# Patient Record
(demographics unavailable — no encounter records)

---

## 2024-10-15 NOTE — ASSESSMENT
[FreeTextEntry1] : Previous doc: 8/18/23: Adv left hip OA. Discussed anti-inflammatories, PT/HEP, IA hip CSI, and briefly HIRO. He is currently being treated and evaluated for peripheral neuropathy. He is having PRP and STEM cell procedure done 9/1/23. He is well informed and would like to proceed with IA hip CSI and PT 7/2/24: MRI of RT hip to eval for muscle tendon tear. Planning on LT hip HIRO in the fall. Will refill Tramadol temporarily for pain. Follow up in 2 week to review MRI 7/19  cont sig left hip with adv O Aon xray and MRI - Rt hip with sign of muscle tear with small lateral hematoma ( we can just watch this) his left hip however has failed conservative treatment and he is ready to proceed with  A- R/B discussed his wife has had bilateral HIRO form me   10/15/24: 2 weeks postop doing very well cont PT, return in 6 weeks.

## 2024-10-15 NOTE — HISTORY OF PRESENT ILLNESS
[Dull/Aching] : dull/aching [] : Post Surgical Visit: yes [de-identified] : 10/15/24: 2 weeks s/p left HIRO having buttocks pain, no fevers/chills.  Home PT.  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 He was recently diagnosed with peripheral neuropathy within the last year. Hx of multiple LESIs by outside pain management. Notes limited ROM.  7/2/24: Here for follow up. Tightness in RT hip until 13 days ago while playing pickleball. Felt sharp pain after twisting right foot wrong. Pain started to worsen in LT due to compensating. 4 days ago, had a weight lifting belt around his hip and felt similar pain from before. Difficulty walking. Using cane.  7/19/24 cont symptoms on rt lateral but all groin pan the left - he has MRI rt hip today his left hip is worse has tried injection - and PT for the hip in the past would like to talk about HIRO  [de-identified] : home therapy  [de-identified] : 10/3/24 [de-identified] : AIXA BOSCH

## 2024-10-15 NOTE — DISCUSSION/SUMMARY
[de-identified] : The patient is doing well at this time. The patient will be started on a course of physical therapy. I recommended that the patient works on range of motion at home and was shown how to do this. I encouraged the patient to increase ambulation. The patient can continue to take Tylenol for occasional discomfort. The patient was advised not to do any dental work for the first three months following the surgery. We will see the patient  back for a follow-up for a repeat evaluation. The patient  will call or return earlier for any questions or concerns.  Signs and symptoms of infection reviewed and patient advised to call immediately for redness, fevers, and/or chills.  I saw the patient under the supervision of Dr. Sue and followed his plan of care.

## 2024-10-15 NOTE — IMAGING
[Bilateral] : hip with pelvis bilaterally [All Views] : anteroposterior, lateral [FreeTextEntry9] : left hip with advanced arthritis, mod arthritis in right hip

## 2024-10-15 NOTE — PHYSICAL EXAM
[Left] : left hip with pelvis [AP] : anteroposterior [Lateral] : lateral [Components well fixed, in good position] : Components well fixed, in good position [de-identified] : Left hip: Mild swelling.  Inc c/d/i.  KARLI.  Cane.

## 2024-12-06 NOTE — DISCUSSION/SUMMARY
[de-identified] : The patient was advised of the diagnosis.  The natural history of the pathology was explained in full to the patient in layman's terms. All questions were answered.  The risks and benefits of surgical and non-surgical treatment alternatives were explained in full to the patient.  I saw the patient under the supervision of Dr. Sue and followed his plan of care.

## 2024-12-06 NOTE — ASSESSMENT
[FreeTextEntry1] : Previous doc: 8/18/23: Adv left hip OA. Discussed anti-inflammatories, PT/HEP, IA hip CSI, and briefly HIRO. He is currently being treated and evaluated for peripheral neuropathy. He is having PRP and STEM cell procedure done 9/1/23. He is well informed and would like to proceed with IA hip CSI and PT 7/2/24: MRI of RT hip to eval for muscle tendon tear. Planning on LT hip HIRO in the fall. Will refill Tramadol temporarily for pain. Follow up in 2 week to review MRI 7/19  cont sig left hip with adv O Aon xray and MRI - Rt hip with sign of muscle tear with small lateral hematoma ( we can just watch this) his left hip however has failed conservative treatment and he is ready to proceed with  A- R/B discussed his wife has had bilateral HIRO form me  10/15/24: 2 weeks postop doing very well cont PT, return in 6 weeks.  12/6/24: 2 months postop doing very well, started playing pickleball without difficulty.  Takes rare tramadol for chronic shoulder issue and will renew this for him today.  Discussed abx for dentist.  Return at 1 year postop.  Cont PT/HEP.

## 2024-12-06 NOTE — HISTORY OF PRESENT ILLNESS
[Dull/Aching] : dull/aching [] : Post Surgical Visit: yes [de-identified] : 12/6/24: 2 months postop min pain.  Some discomfort with stairs.  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 He was recently diagnosed with peripheral neuropathy within the last year. Hx of multiple LESIs by outside pain management. Notes limited ROM.  7/2/24: Here for follow up. Tightness in RT hip until 13 days ago while playing pickleball. Felt sharp pain after twisting right foot wrong. Pain started to worsen in LT due to compensating. 4 days ago, had a weight lifting belt around his hip and felt similar pain from before. Difficulty walking. Using cane.  7/19/24 cont symptoms on rt lateral but all groin pan the left - he has MRI rt hip today his left hip is worse has tried injection - and PT for the hip in the past would like to talk about HIRO  10/15/24: 2 weeks s/p left HIRO having buttocks pain, no fevers/chills.  Home PT. [de-identified] : home therapy  [de-identified] : 10/3/24 [de-identified] : AIXA BOSCH

## 2024-12-06 NOTE — PHYSICAL EXAM
[Left] : left hip with pelvis [AP] : anteroposterior [Lateral] : lateral [Components well fixed, in good position] : Components well fixed, in good position [de-identified] : Left hip: No swelling.  Inc healed.  No pain with ROM.  4+ flex strength.

## 2025-04-25 NOTE — PHYSICAL EXAM
[Left] : left hip with pelvis [AP] : anteroposterior [Lateral] : lateral [Components well fixed, in good position] : Components well fixed, in good position

## 2025-04-25 NOTE — IMAGING
[de-identified] : LEFT HIP no swelling incision healed mild gluteal tenderness no troch tenderness no groin tenderness no adductor tenderness 5/5 strength no pain with resisted hip flexion +2 pt pulses NVI Ambulates without assistance  Xray 3 views LEFT hip/pelvis (4/25/25) - Implants good position and well fixed - no fracture or dislocation and Leg length wnl

## 2025-04-25 NOTE — HISTORY OF PRESENT ILLNESS
[Dull/Aching] : dull/aching [de-identified] : 4/25/25: Almost 7 months s/p L HIRO. Recent onset of mild left buttocks pain- no specific injury- but wants to make sure no issues with HIRO. States 2 months ago he broke his left big toe which is healing now - but feels he was walking differently because of this. 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 He was recently diagnosed with peripheral neuropathy within the last year. Hx of multiple LESIs by outside pain management. Notes limited ROM.  7/2/24: Here for follow up. Tightness in RT hip until 13 days ago while playing pickleball. Felt sharp pain after twisting right foot wrong. Pain started to worsen in LT due to compensating. 4 days ago, had a weight lifting belt around his hip and felt similar pain from before. Difficulty walking. Using cane.  7/19/24 cont symptoms on rt lateral but all groin pan the left - he has MRI rt hip today his left hip is worse has tried injection - and PT for the hip in the past would like to talk about HIRO  10/15/24: 2 weeks s/p left HIRO having buttocks pain, no fevers/chills.  Home PT. 12/6/24: 2 months postop min pain.  Some discomfort with stairs.

## 2025-04-25 NOTE — ASSESSMENT
[FreeTextEntry1] : Previous doc: 8/18/23: Adv left hip OA. Discussed anti-inflammatories, PT/HEP, IA hip CSI, and briefly HIRO. He is currently being treated and evaluated for peripheral neuropathy. He is having PRP and STEM cell procedure done 9/1/23. He is well informed and would like to proceed with IA hip CSI and PT 7/2/24: MRI of RT hip to eval for muscle tendon tear. Planning on LT hip HIRO in the fall. Will refill Tramadol temporarily for pain. Follow up in 2 week to review MRI 7/19  cont sig left hip with adv O Aon xray and MRI - Rt hip with sign of muscle tear with small lateral hematoma ( we can just watch this) his left hip however has failed conservative treatment and he is ready to proceed with  A- R/B discussed his wife has had bilateral HIRO form me  10/15/24: 2 weeks postop doing very well cont PT, return in 6 weeks. 12/6/24: 2 months postop doing very well, started playing pickleball without difficulty.  Takes rare tramadol for chronic shoulder issue and will renew this for him today.  Discussed abx for dentist.  Return at 1 year postop.  Cont PT/HEP.  4/25/25: Almost 7 months s/p L HIRO. XR look good. New onset of gluteal pain, mild- could be related to ihs altered gait from toe fx. He will cont with OTC meds/NSAIDs prn- if no improvement in 6 weeks will return- I see no problems with HIRO at this point

## 2025-04-25 NOTE — DISCUSSION/SUMMARY
[de-identified] : The patient was advised of the diagnosis.  The natural history of the pathology was explained in full to the patient in layman's terms. All questions were answered.  The risks and benefits of surgical and non-surgical treatment alternatives were explained in full to the patient.